# Patient Record
Sex: MALE | Race: ASIAN | Employment: FULL TIME | URBAN - METROPOLITAN AREA
[De-identification: names, ages, dates, MRNs, and addresses within clinical notes are randomized per-mention and may not be internally consistent; named-entity substitution may affect disease eponyms.]

---

## 2019-04-18 ENCOUNTER — HOSPITAL ENCOUNTER (OUTPATIENT)
Dept: ULTRASOUND IMAGING | Age: 34
Discharge: HOME | End: 2019-04-18
Payer: COMMERCIAL

## 2019-04-18 DIAGNOSIS — R79.89 ELEVATED LFTS: ICD-10-CM

## 2019-04-18 PROCEDURE — 76705 ECHO EXAM OF ABDOMEN: CPT

## 2019-04-22 NOTE — PROGRESS NOTES
I called pt's wife (pt does not speak [place]), but wife could not understand and pt friend on the phone that translated to wife, what I was saying. Advised wife that abdominal US was abnormal--steatosis of liver  (fatty infiltrations and also elevated liver enzyme. Advised that Bina recommends referral to [facility] for evaluation. \"Friend\" voiced understanding, and referral placed.   AW/db